# Patient Record
Sex: MALE | Race: BLACK OR AFRICAN AMERICAN | NOT HISPANIC OR LATINO | Employment: UNEMPLOYED | URBAN - METROPOLITAN AREA
[De-identification: names, ages, dates, MRNs, and addresses within clinical notes are randomized per-mention and may not be internally consistent; named-entity substitution may affect disease eponyms.]

---

## 2017-12-04 ENCOUNTER — OFFICE VISIT (OUTPATIENT)
Dept: BARIATRICS/WEIGHT MGMT | Facility: CLINIC | Age: 27
End: 2017-12-04

## 2017-12-04 VITALS
SYSTOLIC BLOOD PRESSURE: 144 MMHG | HEIGHT: 70 IN | RESPIRATION RATE: 18 BRPM | WEIGHT: 279 LBS | HEART RATE: 65 BPM | BODY MASS INDEX: 39.94 KG/M2 | DIASTOLIC BLOOD PRESSURE: 99 MMHG | OXYGEN SATURATION: 99 % | TEMPERATURE: 97.8 F

## 2017-12-04 DIAGNOSIS — R10.13 DYSPEPSIA: Primary | ICD-10-CM

## 2017-12-04 PROCEDURE — 99213 OFFICE O/P EST LOW 20 MIN: CPT | Performed by: PHYSICIAN ASSISTANT

## 2017-12-04 NOTE — PROGRESS NOTES
[facility]  [address]  [facility] [id number] [phone]        Patient Name:  Jose Cao.  :  1990      Date of Visit: 2017      Reason for Visit:  AGB followup    HPI:  Jose Cao is a 27 y.o. male s/p LAGB APS 2009 JSO - aware Dr. Flowers is in [place].  Patient says he has actually moved to [place], but prefers to continue his lapband care here in [facility] for now.    LOV 2016.  Expected band vol 6cc.  Last UGI 16 images reviewed - narrow channel w/ mild pouch enlargement.  Chart review shows hx pouch enlargement on UGI 2013 w/ band vol 6.9cc as well.  Advised unfill if having any issues, o/w no further fills advised.    Returns today for routine/annual eval.  Denies dysphagia unless he just eats something he knows he shouldn't - like bread or beef that has not been chewed well.  Denies reflux/nausea.  No port pain.    Past Medical History:   Diagnosis Date   • ADHD (attention deficit hyperactivity disorder)    • Dyspepsia    • H/O laparoscopic adjustable gastric banding    • Heart murmur    • Obesity      Past Surgical History:   Procedure Laterality Date   • ANKLE SURGERY     • LAPAROSCOPIC GASTRIC BANDING     • TESTICULAR BIOPSY     • TONSILLECTOMY         No Known Allergies    No outpatient prescriptions have been marked as taking for the 17 encounter (Office Visit) with NATHAN Rhoades.       Social History     Social History   • Marital status: Single     Spouse name: N/A   • Number of children: N/A   • Years of education: N/A     Occupational History   • Not on file.     Social History Main Topics   • Smoking status: Never Smoker   • Smokeless tobacco: Not on file   • Alcohol use Not on file   • Drug use: Not on file   • Sexual activity: Not on file     Other Topics Concern   • Not on file     Social History Narrative         /99 (BP Location: Left arm, Patient Position: Sitting, Cuff Size: Large  "Adult)  Pulse 65  Temp 97.8 °F (36.6 °C) (Temporal Artery )   Resp 18  Ht 70\" (177.8 cm)  Wt 279 lb (127 kg)  SpO2 99%  BMI 40.03 kg/m2    Physical Exam   Constitutional: He appears well-developed and well-nourished.   Cardiovascular: Normal rate and regular rhythm.    Pulmonary/Chest: Effort normal.   Abdominal: Soft. There is no tenderness. No hernia.   LLQ port - site unremarkable   Musculoskeletal: Normal range of motion.   Neurological: He is alert.   Skin: Skin is warm and dry.   Psychiatric: He has a normal mood and affect.       Band Adjustment Info   Band Type:   APS   Port Location:   LLQ, tilts lateral   Procedure Position:  supine   Needle Type:  short    Local Anesthesia: with 1% lidocaine and bicarb       Amount Expected (cc):    6.0   Amount Added (cc):    Amount Removed (cc):     Total Amount (cc):    6.0       Able to sylwia water after ADJ?    Other Notes:        Assessment: s/p LAGB APS 5/2009 JSO    ICD-10-CM ICD-9-CM   1. Dyspepsia R10.13 536.8         Plan:    • No adjustment today.  • Annual UGI ordered.      The patient was instructed to follow up pending test results.       NATHAN Rhoades  "

## 2017-12-06 ENCOUNTER — HOSPITAL ENCOUNTER (OUTPATIENT)
Dept: GENERAL RADIOLOGY | Facility: HOSPITAL | Age: 27
Discharge: HOME | End: 2017-12-06
Admitting: PHYSICIAN ASSISTANT

## 2017-12-06 DIAGNOSIS — R10.13 DYSPEPSIA: ICD-10-CM

## 2017-12-06 PROCEDURE — 74241: CPT

## 2017-12-06 RX ADMIN — BARIUM SULFATE 183 ML: 960 POWDER, FOR SUSPENSION ORAL at 09:39

## 2017-12-08 ENCOUNTER — TELEPHONE (OUTPATIENT)
Dept: BARIATRICS/WEIGHT MGMT | Facility: CLINIC | Age: 27
End: 2017-12-08

## 2017-12-08 NOTE — TELEPHONE ENCOUNTER
Pt called and would like the results of his upper GI. It looks like they have been sent to [facility] for review, but pt lives in [place] and would like the results before he goes back home today. Please advise. Thank you.

## 2017-12-11 NOTE — TELEPHONE ENCOUNTER
Please let patient know that unfortunately his band is too tight and he has recurrent enlargement of his pouch above the lapband.  Fortunately he says he is not really having any issues w/ his lapband, but unfortunately w/ pouch enlargement his lapband is not going to be helping him like it should.  This is now the 2nd time this has happened.  He will ultimately need to have fluid removed from his band, and we probably need to discuss whether or not he is going to benefit from keeping his lapband in the future.   I [place].  So if he would prefer to find a bariatric center in [place] to manage his care further he is welcome, or the next time he comes back to [place] we can see him and empty his band and discuss future plans.  Needs to be seen sooner rather than later if issues arise.    Copied and pasted, read to pt exactly what was written.  Pt stated he is still in [place], leaving Wed.  Pt is scheduling w/ Aster now to come in tomorrow to have band unfilled and discuss further options.

## 2017-12-12 ENCOUNTER — OFFICE VISIT (OUTPATIENT)
Dept: BARIATRICS/WEIGHT MGMT | Facility: CLINIC | Age: 27
End: 2017-12-12

## 2017-12-12 VITALS
WEIGHT: 277.5 LBS | DIASTOLIC BLOOD PRESSURE: 108 MMHG | SYSTOLIC BLOOD PRESSURE: 177 MMHG | OXYGEN SATURATION: 99 % | BODY MASS INDEX: 39.73 KG/M2 | HEART RATE: 68 BPM | TEMPERATURE: 97.8 F | RESPIRATION RATE: 18 BRPM | HEIGHT: 70 IN

## 2017-12-12 DIAGNOSIS — Z46.51 FITTING AND ADJUSTMENT OF GASTRIC LAP BAND: ICD-10-CM

## 2017-12-12 DIAGNOSIS — R10.13 DYSPEPSIA: Primary | ICD-10-CM

## 2017-12-12 PROCEDURE — 99214 OFFICE O/P EST MOD 30 MIN: CPT | Performed by: PHYSICIAN ASSISTANT

## 2017-12-12 PROCEDURE — S2083 ADJUSTMENT GASTRIC BAND: HCPCS | Performed by: PHYSICIAN ASSISTANT

## 2017-12-12 NOTE — PROGRESS NOTES
[facility]  [address]  [facility] [id number] [phone]        Patient Name:  Jose Cao.  :  1990      Date of Visit: 2017      Reason for Visit:  AGB followup    HPI:  Jose Cao is a 27 y.o. male s/p LAGB APS 2009 JSO - aware Dr. Flowers is in [place].  Patient says he has actually moved to [place], but prefers to continue his lapband care here in [facility] for now.    Expected band vol 6cc.  Recent UGI 17 shows pouch enlargement.  Chart review shows hx pouch enlargement on UGI 2013 w/ band vol 6.9cc as well - required complete unfill at that time.      Says only issue w/ his band is some dysphagia to breads or beef if he doesn't chew well.  Otherwise denies reflux/nausea/abd.pain/port pain.  Feels that his lapband helps him w/ portion control and weight maintenance.  Has not yet considered any other weight loss surgery options at this time.    Past Medical History:   Diagnosis Date   • ADHD (attention deficit hyperactivity disorder)    • Dyspepsia    • H/O laparoscopic adjustable gastric banding    • Heart murmur    • Obesity      Past Surgical History:   Procedure Laterality Date   • ANKLE SURGERY     • LAPAROSCOPIC GASTRIC BANDING     • TESTICULAR BIOPSY     • TONSILLECTOMY         No Known Allergies    No outpatient prescriptions have been marked as taking for the 17 encounter (Office Visit) with NATHAN Rhoades.       Social History     Social History   • Marital status: Single     Spouse name: N/A   • Number of children: N/A   • Years of education: N/A     Occupational History   • Not on file.     Social History Main Topics   • Smoking status: Never Smoker   • Smokeless tobacco: Not on file   • Alcohol use Not on file   • Drug use: Not on file   • Sexual activity: Not on file     Other Topics Concern   • Not on file     Social History Narrative         BP (!) 177/108 (BP Location: Left arm, Patient  "Position: Sitting, Cuff Size: Large Adult)  Pulse 68  Temp 97.8 °F (36.6 °C) (Temporal Artery )   Resp 18  Ht 177.8 cm (70\")  Wt 126 kg (277 lb 8 oz)  SpO2 99%  BMI 39.82 kg/m2    Physical Exam   Constitutional: He appears well-developed and well-nourished.   Cardiovascular: Normal rate and regular rhythm.    Pulmonary/Chest: Effort normal.   Abdominal: Soft. There is no tenderness. No hernia.   LLQ port - site unremarkable   Musculoskeletal: Normal range of motion.   Neurological: He is alert.   Skin: Skin is warm and dry.   Psychiatric: He has a normal mood and affect.       Band Adjustment Info   Band Type:   APS   Port Location:   LLQ, tilts lateral   Procedure Position:  supine   Needle Type:  short    Local Anesthesia: with 1% lidocaine and bicarb       Amount Expected (cc):    6.0   Amount Added (cc):    Amount Removed (cc):   -1.5cc clear fluid   Total Amount (cc):    4.5cc       Able to sylwia water after ADJ? YES   Other Notes:        Assessment: s/p LAGB APS 5/2009 JSO    ICD-10-CM ICD-9-CM   1. Dyspepsia R10.13 536.8   2. Fitting and adjustment of gastric lap band Z46.51 V53.51         Plan:   UGI results reviewed and discussed w/ patient.  Partial unfill advised.  Lapband ADJ today w/out difficulty.  Advised warm liquids x 24 hrs w/ gradual diet progression.  Plan to repeat UGI in 6-12 weeks.  Patient opts to wait the full 12 weeks prior to repeating UGI.  Understands UGI must be normal prior to refilling the band.  Also discussed possible LSG revision.  Patient will research.  Call w/ issues/concerns.      The patient was instructed to follow up pending test results, sooner if needed.       NATHAN Rhoades  "

## 2018-05-14 ENCOUNTER — TELEPHONE (OUTPATIENT)
Dept: BARIATRICS/WEIGHT MGMT | Facility: CLINIC | Age: 28
End: 2018-05-14

## 2018-05-14 NOTE — TELEPHONE ENCOUNTER
Pt called in, It is time for his UGI that was ordered at a later date and pt is wanting to know if it is ok to have his UGI at [facility] in [facility] where he lives now.  Please advise, thank you.

## 2018-05-15 NOTE — TELEPHONE ENCOUNTER
Notified pt that you said it was ok for him to have his UGI in [place], but you were not certain we would be able to set that up for him.  Pt stated he does not have a PCP to help him out right now.  Pt also stated that he is just going to have it done in [place].  Pt said he will call and schedule it on a Friday and drive home.  I gave the pt the number to CS so he can call CS and schedule.